# Patient Record
Sex: FEMALE | Race: WHITE
[De-identification: names, ages, dates, MRNs, and addresses within clinical notes are randomized per-mention and may not be internally consistent; named-entity substitution may affect disease eponyms.]

---
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
## 2018-01-23 ENCOUNTER — HOSPITAL ENCOUNTER (OUTPATIENT)
Dept: HOSPITAL 58 - LAB | Age: 18
Discharge: HOME | End: 2018-01-23
Attending: INTERNAL MEDICINE

## 2018-01-23 VITALS — BODY MASS INDEX: 32.9 KG/M2

## 2018-01-23 DIAGNOSIS — J06.9: Primary | ICD-10-CM

## 2018-01-23 PROCEDURE — 87502 INFLUENZA DNA AMP PROBE: CPT

## 2018-01-23 PROCEDURE — 87651 STREP A DNA AMP PROBE: CPT

## 2018-02-25 ENCOUNTER — HOSPITAL ENCOUNTER (EMERGENCY)
Dept: HOSPITAL 58 - ED | Age: 18
Discharge: HOME | End: 2018-02-25

## 2018-02-25 VITALS — DIASTOLIC BLOOD PRESSURE: 84 MMHG | SYSTOLIC BLOOD PRESSURE: 122 MMHG | TEMPERATURE: 98.5 F

## 2018-02-25 VITALS — BODY MASS INDEX: 32.9 KG/M2

## 2018-02-25 DIAGNOSIS — J32.1: ICD-10-CM

## 2018-02-25 DIAGNOSIS — R04.0: Primary | ICD-10-CM

## 2018-02-25 PROCEDURE — 99282 EMERGENCY DEPT VISIT SF MDM: CPT

## 2018-02-25 NOTE — ED.PDOC
General


ED Provider: 


Dr. DEANA RECINOS





Chief Complaint: Nosebleed


Stated Complaint: Patient is a 17 year old female who state she has had right 

sided headeache around the right eye. Starting today then she had a nose bleed 

for one hour prior to arrival. Bleeding has now stopped since placeing pressure 

on ther nose.


Time Seen by Physician: 22:52


Mode of Arrival: Walk-In


Information Source: Patient


Nursing and Triage Documentation Reviewed and Agree: Yes


Reviewed sepsis parameters & appropriate labs ordered?: No


System Inflammatory Response Syndrome: Not Applicable


Sepsis Protocol: 


For patient's 13 years and over:





Temp is 96.8 and below  and greater


Pulse >90 BPM


Resp >20/minute


Acutely Altered Mental Status





Are patient's symptoms suggestive of a new infection, such as:


   -Pneumonia


   -Skin, Soft Tissue


   -Endocarditis


   -UTI


   -Bone, Joint Infection


   -Implantable Device


   -Acute Abdominal Infection


   -Wound Infection


   -Meningitis


   -Blood Stream Catheter Infection


   -Unknown





System Inflammatory Response Syndrome: Not Applicable





EENT Complaint Exam





- Nasal Complaint/Exam


Onset/Duration: 1 day


Symptoms Are: Resolved


Timing: Intermittent


Initial Severity: Moderate


Current Severity: None


Location: Bilateral


Character: Light bleeding


Aggravating: Reports: URI.  Denies: Nasal trauma, Nasal picking, Hypertension


Alleviating: Reports: Pressure


Associated Signs and Symptoms: Reports: Sinus pain, Nasal discharge


Nasal Surgical History: Reports: None


Foreign Body Present: No


Septal Hematoma: No


Differential Diagnoses: Epistaxis, Sinusitis





Review of Systems





- Review Of Systems


Constitutional: Reports: No symptoms


Eyes: Reports: No symptoms


Ears, Nose, Mouth, Throat: Reports: Nose discharge, Epistaxis


Respiratory: Reports: No symptoms


Cardiac: Reports: No symptoms


GI: Reports: No symptoms


: Reports: No symptoms


Musculoskeletal: Reports: No symptoms


Skin: Reports: No symptoms


Neurological: Reports: Anxiety, Headache (Rigth periorbital area.)


Endocrine: Reports: No symptoms


Hematologic/Lymphatic: Reports: No symptoms


All Other Systems: Reviewed and Negative





Past Medical History





- Past Medical History


Previously Healthy: Yes


Endocrine: Reports: Unknown


Cardiovascular: Reports: Unknown


Respiratory: Reports: Unknown


Hematological: Reports: Unknown


Gastrointestinal: Reports: Unknown


Genitourinary: Reports: Unknown


Neuro/Psych: Reports: Unknown


Musculoskeletal: Reports: Unknown


Cancer: Reports: Unknown


Last Menstrual Period: 1/28/18





- Surgical History


General Surgical History: Reports: Unknown





- Family History


Family History: Reports: Unknown





- Social History


Smoking Status: Never smoker


Hx Substance Use: No


Alcohol Screening: None





- Immunizations


Tetanus Shot up to Date: Yes





Physical Exam





- Physical Exam


Appearance: Ill-appearing, Obese


Ill-appearing: Mild


Eyes: RHIANNA, EOMI, Conjunctiva clear


ENT: Ears normal, Oropharynx normal, Epistaxis


Neck: Supple


Respiratory: Airway patent, Breath sounds clear, Breath sounds equal, 

Respirations nonlabored


Cardiovascular: RRR, Pulses normal, No rub, No murmur


GI/: Soft, Nontender, No masses, Bowel sounds normal, No Organomegaly


Musculoskeletal: Normal strength, ROM intact, No edema, No calf tenderness


Skin: Warm, Dry, Normal color


Neurological: Sensation intact, Motor intact, Reflexes intact, Cranial nerves 

intact, Alert, Oriented


Psychiatric: Anxious





Critical Care Note





- Critical Care Note


Total Time (mins): 0





Course





- Course


Orders, Labs, Meds: 





Orders











 Category Date Time Status


 


 Acetaminophen [Tylenol] MEDS  02/25/18 22:47 Stat





 1,000 mg PO ONCE STA   


 


 Azithromycin [Zithromax] MEDS  02/25/18 22:49 Stat





 500 mg PO ONCE STA   








Medications














Discontinued Medications














Generic Name Dose Route Start Last Admin





  Trade Name Freq  PRN Reason Stop Dose Admin


 


Acetaminophen  1,000 mg  02/25/18 22:47  





  Tylenol  PO  02/25/18 22:48  





  ONCE STA   


 


Azithromycin  500 mg  02/25/18 22:49  





  Zithromax  PO  02/25/18 22:50  





  ONCE STA   











Vital Signs: 





 











  Temp Pulse Resp BP Pulse Ox


 


 02/25/18 22:17  98.7 F  92 H  22  140/84  99














Departure





- Departure


Time of Disposition: 23:00


Disposition: HOME SELF-CARE


Discharge Problem: 


 Epistaxis, Sinusitis chronic, frontal





Instructions:  Nosebleed (ED), Sinusitis (ED)


Condition: Stable


Pt referred to PMD for follow-up: Yes


IPMP verified?: No


Additional Instructions: 


take Antibiotics as prescribe 


Alternate Tylenol with Motrin as needed for pain or fever 


Prescriptions: 


Azithromycin [Zithromax] 250 mg PO AS DIRECTED #4 tablet


Allergies/Adverse Reactions: 


Allergies





ibuprofen Adverse Reaction (Verified 02/25/18 22:26)


 Hives


 LIQUID MOTRIN ONLY 


Penicillins Adverse Reaction (Verified 07/09/17 23:36)


 Hives








Home Medications: 


Ambulatory Orders





Levonorgestrel [Alanis] 1 each IY AS DIRECTED 01/23/18 


Azithromycin [Zithromax] 250 mg PO AS DIRECTED #4 tablet 02/25/18 








Disposition Discussed With: Patient, Family